# Patient Record
Sex: FEMALE | Race: OTHER | HISPANIC OR LATINO
[De-identification: names, ages, dates, MRNs, and addresses within clinical notes are randomized per-mention and may not be internally consistent; named-entity substitution may affect disease eponyms.]

---

## 2017-04-24 ENCOUNTER — APPOINTMENT (OUTPATIENT)
Dept: OBGYN | Facility: CLINIC | Age: 37
End: 2017-04-24

## 2017-07-17 ENCOUNTER — RESULT REVIEW (OUTPATIENT)
Age: 37
End: 2017-07-17

## 2017-07-18 ENCOUNTER — APPOINTMENT (OUTPATIENT)
Dept: OBGYN | Facility: CLINIC | Age: 37
End: 2017-07-18

## 2018-09-12 ENCOUNTER — APPOINTMENT (OUTPATIENT)
Dept: OBGYN | Facility: CLINIC | Age: 38
End: 2018-09-12
Payer: COMMERCIAL

## 2018-09-12 ENCOUNTER — RESULT REVIEW (OUTPATIENT)
Age: 38
End: 2018-09-12

## 2018-09-12 PROCEDURE — 99395 PREV VISIT EST AGE 18-39: CPT

## 2019-08-15 ENCOUNTER — FORM ENCOUNTER (OUTPATIENT)
Age: 39
End: 2019-08-15

## 2019-08-16 ENCOUNTER — APPOINTMENT (OUTPATIENT)
Dept: OBGYN | Facility: CLINIC | Age: 39
End: 2019-08-16
Payer: COMMERCIAL

## 2019-08-16 ENCOUNTER — RESULT REVIEW (OUTPATIENT)
Age: 39
End: 2019-08-16

## 2019-08-16 PROCEDURE — 99396 PREV VISIT EST AGE 40-64: CPT

## 2019-09-26 ENCOUNTER — FORM ENCOUNTER (OUTPATIENT)
Age: 39
End: 2019-09-26

## 2020-11-08 ENCOUNTER — FORM ENCOUNTER (OUTPATIENT)
Age: 40
End: 2020-11-08

## 2020-11-09 ENCOUNTER — APPOINTMENT (OUTPATIENT)
Dept: OBGYN | Facility: CLINIC | Age: 40
End: 2020-11-09
Payer: COMMERCIAL

## 2020-11-09 ENCOUNTER — RESULT REVIEW (OUTPATIENT)
Age: 40
End: 2020-11-09

## 2020-11-09 VITALS
WEIGHT: 179 LBS | DIASTOLIC BLOOD PRESSURE: 70 MMHG | SYSTOLIC BLOOD PRESSURE: 116 MMHG | BODY MASS INDEX: 30.56 KG/M2 | HEIGHT: 64 IN

## 2020-11-09 PROCEDURE — 99072 ADDL SUPL MATRL&STAF TM PHE: CPT

## 2020-11-09 PROCEDURE — 36415 COLL VENOUS BLD VENIPUNCTURE: CPT

## 2020-11-09 PROCEDURE — 99396 PREV VISIT EST AGE 40-64: CPT

## 2020-11-15 ENCOUNTER — FORM ENCOUNTER (OUTPATIENT)
Age: 40
End: 2020-11-15

## 2020-11-26 ENCOUNTER — FORM ENCOUNTER (OUTPATIENT)
Age: 40
End: 2020-11-26

## 2021-01-24 ENCOUNTER — FORM ENCOUNTER (OUTPATIENT)
Age: 41
End: 2021-01-24

## 2021-01-26 ENCOUNTER — NON-APPOINTMENT (OUTPATIENT)
Age: 41
End: 2021-01-26

## 2021-01-26 ENCOUNTER — APPOINTMENT (OUTPATIENT)
Dept: CARDIOLOGY | Facility: CLINIC | Age: 41
End: 2021-01-26
Payer: COMMERCIAL

## 2021-01-26 VITALS
BODY MASS INDEX: 31.41 KG/M2 | HEIGHT: 64 IN | SYSTOLIC BLOOD PRESSURE: 105 MMHG | OXYGEN SATURATION: 98 % | WEIGHT: 184 LBS | HEART RATE: 58 BPM | DIASTOLIC BLOOD PRESSURE: 69 MMHG

## 2021-01-26 PROCEDURE — 99203 OFFICE O/P NEW LOW 30 MIN: CPT

## 2021-01-26 PROCEDURE — 99072 ADDL SUPL MATRL&STAF TM PHE: CPT

## 2021-01-26 PROCEDURE — 93000 ELECTROCARDIOGRAM COMPLETE: CPT | Mod: NC

## 2021-01-28 ENCOUNTER — RESULT CHARGE (OUTPATIENT)
Age: 41
End: 2021-01-28

## 2021-02-12 ENCOUNTER — TRANSCRIPTION ENCOUNTER (OUTPATIENT)
Age: 41
End: 2021-02-12

## 2021-09-02 ENCOUNTER — LABORATORY RESULT (OUTPATIENT)
Age: 41
End: 2021-09-02

## 2021-09-02 ENCOUNTER — APPOINTMENT (OUTPATIENT)
Dept: CARDIOLOGY | Facility: CLINIC | Age: 41
End: 2021-09-02
Payer: COMMERCIAL

## 2021-09-02 ENCOUNTER — NON-APPOINTMENT (OUTPATIENT)
Age: 41
End: 2021-09-02

## 2021-09-02 VITALS
SYSTOLIC BLOOD PRESSURE: 108 MMHG | DIASTOLIC BLOOD PRESSURE: 71 MMHG | BODY MASS INDEX: 30.56 KG/M2 | HEART RATE: 77 BPM | OXYGEN SATURATION: 95 % | WEIGHT: 179 LBS | HEIGHT: 64 IN

## 2021-09-02 DIAGNOSIS — Z01.818 ENCOUNTER FOR OTHER PREPROCEDURAL EXAMINATION: ICD-10-CM

## 2021-09-02 LAB
ALBUMIN SERPL ELPH-MCNC: 4.7 G/DL
ALP BLD-CCNC: 87 U/L
ALT SERPL-CCNC: 16 U/L
ANION GAP SERPL CALC-SCNC: 15 MMOL/L
APPEARANCE: CLEAR
AST SERPL-CCNC: 21 U/L
BASOPHILS # BLD AUTO: 0.04 K/UL
BASOPHILS NFR BLD AUTO: 0.6 %
BILIRUB SERPL-MCNC: 0.3 MG/DL
BILIRUBIN URINE: NEGATIVE
BLOOD URINE: NEGATIVE
BUN SERPL-MCNC: 20 MG/DL
CALCIUM SERPL-MCNC: 9.2 MG/DL
CHLORIDE SERPL-SCNC: 102 MMOL/L
CO2 SERPL-SCNC: 23 MMOL/L
COLOR: YELLOW
CREAT SERPL-MCNC: 0.57 MG/DL
EOSINOPHIL # BLD AUTO: 0.13 K/UL
EOSINOPHIL NFR BLD AUTO: 2 %
GLUCOSE QUALITATIVE U: NEGATIVE
GLUCOSE SERPL-MCNC: 80 MG/DL
HCG SERPL-MCNC: <1 MIU/ML
HCT VFR BLD CALC: 42.2 %
HGB BLD-MCNC: 13.5 G/DL
IMM GRANULOCYTES NFR BLD AUTO: 0.5 %
INR PPP: 0.92 RATIO
KETONES URINE: NEGATIVE
LEUKOCYTE ESTERASE URINE: NEGATIVE
LYMPHOCYTES # BLD AUTO: 1.52 K/UL
LYMPHOCYTES NFR BLD AUTO: 23.3 %
MAN DIFF?: NORMAL
MCHC RBC-ENTMCNC: 31.7 PG
MCHC RBC-ENTMCNC: 32 GM/DL
MCV RBC AUTO: 99.1 FL
MONOCYTES # BLD AUTO: 0.58 K/UL
MONOCYTES NFR BLD AUTO: 8.9 %
NEUTROPHILS # BLD AUTO: 4.22 K/UL
NEUTROPHILS NFR BLD AUTO: 64.7 %
NITRITE URINE: NEGATIVE
PH URINE: 6
PLATELET # BLD AUTO: 260 K/UL
POTASSIUM SERPL-SCNC: 4.7 MMOL/L
PROT SERPL-MCNC: 7 G/DL
PROTEIN URINE: NORMAL
PT BLD: 11 SEC
RBC # BLD: 4.26 M/UL
RBC # FLD: 13.6 %
SODIUM SERPL-SCNC: 139 MMOL/L
SPECIFIC GRAVITY URINE: 1.02
UROBILINOGEN URINE: ABNORMAL
WBC # FLD AUTO: 6.52 K/UL

## 2021-09-02 PROCEDURE — 99214 OFFICE O/P EST MOD 30 MIN: CPT

## 2021-09-02 PROCEDURE — 93000 ELECTROCARDIOGRAM COMPLETE: CPT | Mod: NC

## 2021-11-09 DIAGNOSIS — Z92.89 PERSONAL HISTORY OF OTHER MEDICAL TREATMENT: ICD-10-CM

## 2021-11-09 DIAGNOSIS — Z87.59 PERSONAL HISTORY OF OTHER COMPLICATIONS OF PREGNANCY, CHILDBIRTH AND THE PUERPERIUM: ICD-10-CM

## 2021-11-09 DIAGNOSIS — Z78.9 OTHER SPECIFIED HEALTH STATUS: ICD-10-CM

## 2021-11-09 DIAGNOSIS — Z87.42 PERSONAL HISTORY OF OTHER DISEASES OF THE FEMALE GENITAL TRACT: ICD-10-CM

## 2021-11-09 DIAGNOSIS — N97.9 FEMALE INFERTILITY, UNSPECIFIED: ICD-10-CM

## 2021-11-09 DIAGNOSIS — Z98.890 OTHER SPECIFIED POSTPROCEDURAL STATES: ICD-10-CM

## 2021-12-29 ENCOUNTER — FORM ENCOUNTER (OUTPATIENT)
Age: 41
End: 2021-12-29

## 2021-12-30 ENCOUNTER — APPOINTMENT (OUTPATIENT)
Dept: OBGYN | Facility: CLINIC | Age: 41
End: 2021-12-30
Payer: COMMERCIAL

## 2021-12-30 VITALS
DIASTOLIC BLOOD PRESSURE: 80 MMHG | HEIGHT: 64 IN | BODY MASS INDEX: 28 KG/M2 | HEART RATE: 85 BPM | WEIGHT: 164 LBS | SYSTOLIC BLOOD PRESSURE: 126 MMHG

## 2021-12-30 DIAGNOSIS — Z11.59 ENCOUNTER FOR SCREENING FOR OTHER VIRAL DISEASES: ICD-10-CM

## 2021-12-30 DIAGNOSIS — Z01.419 ENCOUNTER FOR GYNECOLOGICAL EXAMINATION (GENERAL) (ROUTINE) W/OUT ABNORMAL FINDINGS: ICD-10-CM

## 2021-12-30 DIAGNOSIS — Z13.29 ENCOUNTER FOR SCREENING FOR OTHER SUSPECTED ENDOCRINE DISORDER: ICD-10-CM

## 2021-12-30 PROCEDURE — 36415 COLL VENOUS BLD VENIPUNCTURE: CPT

## 2021-12-30 NOTE — END OF VISIT
[FreeTextEntry3] : I, Rocky Cayetano, acted solely as a scribe for Dr. Ravi Vizcaino on 12/30/2021.\par \par All medical record entries made by the scribe were at my, Dr. Ravi Vizcaino, direction and personally dictated by me on 12/30/2021. I have reviewed the chart and agree that the record accurately reflects my personal performance of the history, physical exam, assessment and plan. I have also personally directed, reviewed, and agreed with the chart.

## 2021-12-30 NOTE — PHYSICAL EXAM

## 2021-12-30 NOTE — PLAN
Suboptimally controlled at this time given recent death of her grandmother. Experiencing side effects to the Sertraline and not very effective at this point. Plan to switch to Lexapro and increase as needed and tolerated. Kaykay Peng MD    [FreeTextEntry1] : Pap\par Rx Mammo/Sono\par Recommended follow up with PCP\par \par Irregular periods\par Will check TSH, FSH, LH,quant\par

## 2021-12-30 NOTE — HISTORY OF PRESENT ILLNESS
[FreeTextEntry1] : 41 year old female , LMP 21 presents for annual GYN. \par Due to follow up with PCP. \par Rx mammo. \par States she had irregular cycles after COVID Vaccine. \par \par \par OB History: 3 to 1 loss last pregnancy, P2 ( x 2) \par PGynHx: h/o ovarian cyst, endometriosis, infertility, PCOS\par SurgHx: breast implants; D&C

## 2021-12-31 LAB
COVID-19 SPIKE DOMAIN ANTIBODY INTERPRETATION: POSITIVE
SARS-COV-2 AB SERPL IA-ACNC: >250 U/ML

## 2022-01-09 LAB
CYTOLOGY CVX/VAG DOC THIN PREP: NORMAL
FSH SERPL-MCNC: 6.2 IU/L
HPV HIGH+LOW RISK DNA PNL CVX: NOT DETECTED
LH SERPL-ACNC: 10.9 IU/L
TSH SERPL-ACNC: 2.09 UIU/ML

## 2022-10-12 PROBLEM — Z00.00 ENCOUNTER FOR PREVENTIVE HEALTH EXAMINATION: Status: ACTIVE | Noted: 2022-10-12

## 2023-01-31 ENCOUNTER — APPOINTMENT (OUTPATIENT)
Dept: OBGYN | Facility: CLINIC | Age: 43
End: 2023-01-31
Payer: COMMERCIAL

## 2023-01-31 VITALS
DIASTOLIC BLOOD PRESSURE: 70 MMHG | BODY MASS INDEX: 27.31 KG/M2 | SYSTOLIC BLOOD PRESSURE: 122 MMHG | HEIGHT: 64 IN | WEIGHT: 160 LBS

## 2023-01-31 DIAGNOSIS — N80.9 ENDOMETRIOSIS, UNSPECIFIED: ICD-10-CM

## 2023-01-31 DIAGNOSIS — O09.529 SUPERVISION OF ELDERLY MULTIGRAVIDA, UNSPECIFIED TRIMESTER: ICD-10-CM

## 2023-01-31 DIAGNOSIS — Z98.82 BREAST IMPLANT STATUS: ICD-10-CM

## 2023-01-31 DIAGNOSIS — E28.2 POLYCYSTIC OVARIAN SYNDROME: ICD-10-CM

## 2023-01-31 DIAGNOSIS — Z12.31 ENCOUNTER FOR SCREENING MAMMOGRAM FOR MALIGNANT NEOPLASM OF BREAST: ICD-10-CM

## 2023-01-31 DIAGNOSIS — O09.819 SUPERVISION OF PREGNANCY RESULTING FROM ASSISTED REPRODUCTIVE TECHNOLOGY, UNSPECIFIED TRIMESTER: ICD-10-CM

## 2023-01-31 PROCEDURE — 99396 PREV VISIT EST AGE 40-64: CPT

## 2023-01-31 NOTE — HISTORY OF PRESENT ILLNESS
[FreeTextEntry1] : 41yo  presents for routine gyn exam.  Pt. reports her periods are usually every 5-6 weeks and her bleeding can be on the heavy side.  No change in cycle. Pt. with Hx of endomitosis and PCOS as well as infertility.  , declines sti screen\par \par \par \par <<<<<Last exam 21 AV with VK\par 41 year old female , LMP 21 presents for annual GYN. \par Due to follow up with PCP. \par Rx mammo. \par States she had irregular cycles after COVID Vaccine. \par \par \par OB History: 3 to 1 loss last pregnancy, P2 ( x 2) \par PGynHx: h/o ovarian cyst, endometriosis, infertility, PCOS\par SurgHx: breast implants; D&C  \par \par Info. from prior EMR:\par Demographics: Race: White Ethnicity: Not  or  Native Language: English Occupation: sales\par Referring Physician: Dr Cabrera\par : 2 Para: 2 0 0 2\par OB History: 3 to 1 loss last pregnancy, P2 ( x 2)\par GYN\par h/o ovarian cyst, endometriosis, infertility, PCOS Cycle: 40 Duration: 5 Flow: normal Sexually Active \par \par Type of Contraception: pull out\par PMH\par No significant past medical history.\par Surgical History: No significant surgical history.\par Surgical History: breast implants; D&C \par Allergies: nkda\par Current Medications: Prescribed/Suppliments/OTC none\par Medications Verified Medications Verified\par Last PAP: 2018 -- Pap / HPV Neg.  VD  16 Last Mammo: 2019 - mammo - benign (dense breasts & implants) Last Breast Sono:: - breast sono - birads 3 - benign-appearing hypoechoic mass on L --> f/u sono in 6 months Last OB Sono: 2015\par Family Hx\par No significant family history\par \par Personal history of blood clots/DVT/PE: no\par Personal history of conditions causing increased risk of blood clots/DVT/PE: no\par Family history of blood clots/DVT/PE: no\par Family history of conditions causing increased risk of blood clots/DVT/PE: no\par \par Social History\par Patient nonsmoker and has no familial exposure to second hand smoke\par Smoker Status: Never smoke\par  [Mammogramdate] : 2/2022 [TextBox_19] : wnl [BreastSonogramDate] : 2/2022 [TextBox_25] : wnl [PapSmeardate] : 12/2021 [TextBox_31] : wnl

## 2023-01-31 NOTE — PLAN
[FreeTextEntry1] : HCM\par -SBE\par -pap & HPV today\par -Rx mammo/sono given\par -MVI, Calcium, Vit d\par -Weight/exercise\par \par Menorrhagia\par -to try NSAIDS prior to menses\par -dicussed cocps, IUDs pt. declines at this time\par \par RTO 1 year\par

## 2023-02-04 LAB — HPV HIGH+LOW RISK DNA PNL CVX: NOT DETECTED

## 2023-02-08 LAB — CYTOLOGY CVX/VAG DOC THIN PREP: NORMAL

## 2023-05-26 ENCOUNTER — NON-APPOINTMENT (OUTPATIENT)
Age: 43
End: 2023-05-26

## 2024-06-14 ENCOUNTER — LABORATORY RESULT (OUTPATIENT)
Age: 44
End: 2024-06-14

## 2024-06-14 ENCOUNTER — APPOINTMENT (OUTPATIENT)
Dept: OBGYN | Facility: CLINIC | Age: 44
End: 2024-06-14
Payer: COMMERCIAL

## 2024-06-14 VITALS
HEIGHT: 64 IN | DIASTOLIC BLOOD PRESSURE: 83 MMHG | SYSTOLIC BLOOD PRESSURE: 120 MMHG | WEIGHT: 165 LBS | BODY MASS INDEX: 28.17 KG/M2

## 2024-06-14 DIAGNOSIS — Z01.419 ENCOUNTER FOR GYNECOLOGICAL EXAMINATION (GENERAL) (ROUTINE) W/OUT ABNORMAL FINDINGS: ICD-10-CM

## 2024-06-14 PROCEDURE — 99396 PREV VISIT EST AGE 40-64: CPT

## 2024-06-14 NOTE — PLAN
[FreeTextEntry1] : Routine GYN Exam -Discussed and reviewed importance of monthly BSE -Declines STI testing, importance safe sexual practices discussed -Pap/HPV test collected and sent at todays visit -UTD mammo ZP 5/2024, task sent to obtain and review report. -Osteoporosis prevention; recc. vitd/Calcium supplementation and WBE to maintain bone density; start DEXA >65 -f/u PCP for recommended HCM, vaccinations and CA screening -utd colon screen  rto 1 yr or sooner as needed.

## 2024-06-14 NOTE — PHYSICAL EXAM
[Chaperone Declined] : Patient declined chaperone [Appropriately responsive] : appropriately responsive [Alert] : alert [No Acute Distress] : no acute distress [No Lymphadenopathy] : no lymphadenopathy [Soft] : soft [Non-tender] : non-tender [Non-distended] : non-distended [No HSM] : No HSM [No Lesions] : no lesions [No Mass] : no mass [Oriented x3] : oriented x3 [FreeTextEntry6] : reduction scarring [Examination Of The Breasts] : a normal appearance [] : implants [No Masses] : no breast masses were palpable [Labia Majora] : normal [Labia Minora] : normal [Normal] : normal [Tenderness] : nontender [Uterine Adnexae] : normal

## 2024-06-14 NOTE — HISTORY OF PRESENT ILLNESS
[FreeTextEntry1] : 44 yo  LMP 5/10/24 presents for routine gyn exam. Pt. reports her periods are usually every 5-6 weeks and her bleeding can be on the heavy side. No change in cycle. Pt. with Hx of endomitosis and PCOS as well as infertility. , declines sti screen. Denies fmhx Ca. No drug/tobacco use. +social 1-2drink weekly on weekends. Overall doing well, not interested in contraceptive options (ocp/LARC) for HMB/cramping w/menses[facility] for review. Report as nl. Hx of bl breast reduction w/ lif/implant .  [Patient reported mammogram was normal] : Patient reported mammogram was normal [Patient reported PAP Smear was normal] : Patient reported PAP Smear was normal [Patient reported colonoscopy was normal] : Patient reported colonoscopy was normal [Mammogramdate] : 5/2024 [PapSmeardate] : 1/31/23 [ColonoscopyDate] : 2019 [Patient refuses STI testing] : Patient refuses STI testing

## 2025-08-12 ENCOUNTER — APPOINTMENT (OUTPATIENT)
Dept: OBGYN | Facility: CLINIC | Age: 45
End: 2025-08-12

## 2025-08-21 ENCOUNTER — NON-APPOINTMENT (OUTPATIENT)
Age: 45
End: 2025-08-21